# Patient Record
Sex: MALE | Race: WHITE | Employment: OTHER | URBAN - METROPOLITAN AREA
[De-identification: names, ages, dates, MRNs, and addresses within clinical notes are randomized per-mention and may not be internally consistent; named-entity substitution may affect disease eponyms.]

---

## 2021-07-04 ENCOUNTER — HOSPITAL ENCOUNTER (INPATIENT)
Age: 56
LOS: 1 days | Discharge: LEFT BEFORE DISCHARGE | DRG: 394 | End: 2021-07-06
Attending: EMERGENCY MEDICINE | Admitting: FAMILY MEDICINE

## 2021-07-04 ENCOUNTER — APPOINTMENT (OUTPATIENT)
Dept: CT IMAGING | Age: 56
DRG: 394 | End: 2021-07-04
Attending: EMERGENCY MEDICINE

## 2021-07-04 DIAGNOSIS — R11.2 NAUSEA AND VOMITING, INTRACTABILITY OF VOMITING NOT SPECIFIED, UNSPECIFIED VOMITING TYPE: ICD-10-CM

## 2021-07-04 DIAGNOSIS — K92.2 GASTROINTESTINAL HEMORRHAGE, UNSPECIFIED GASTROINTESTINAL HEMORRHAGE TYPE: ICD-10-CM

## 2021-07-04 DIAGNOSIS — R10.84 ABDOMINAL PAIN, GENERALIZED: Primary | ICD-10-CM

## 2021-07-04 LAB
ALBUMIN SERPL-MCNC: 3.3 G/DL (ref 3.5–5)
ALBUMIN/GLOB SERPL: 1.3 {RATIO} (ref 1.1–2.2)
ALP SERPL-CCNC: 71 U/L (ref 45–117)
ALT SERPL-CCNC: 25 U/L (ref 12–78)
ANION GAP SERPL CALC-SCNC: 6 MMOL/L (ref 5–15)
APPEARANCE UR: CLEAR
APTT PPP: 26 SEC (ref 22.1–31)
AST SERPL-CCNC: 18 U/L (ref 15–37)
BASOPHILS # BLD: 0 K/UL (ref 0–0.1)
BASOPHILS NFR BLD: 1 % (ref 0–1)
BILIRUB SERPL-MCNC: 0.5 MG/DL (ref 0.2–1)
BILIRUB UR QL: NEGATIVE
BUN SERPL-MCNC: 12 MG/DL (ref 6–20)
BUN/CREAT SERPL: 13 (ref 12–20)
CALCIUM SERPL-MCNC: 7.9 MG/DL (ref 8.5–10.1)
CHLORIDE SERPL-SCNC: 107 MMOL/L (ref 97–108)
CO2 SERPL-SCNC: 28 MMOL/L (ref 21–32)
COLOR UR: ABNORMAL
CREAT SERPL-MCNC: 0.91 MG/DL (ref 0.7–1.3)
DIFFERENTIAL METHOD BLD: ABNORMAL
EOSINOPHIL # BLD: 0.1 K/UL (ref 0–0.4)
EOSINOPHIL NFR BLD: 2 % (ref 0–7)
ERYTHROCYTE [DISTWIDTH] IN BLOOD BY AUTOMATED COUNT: 19.7 % (ref 11.5–14.5)
GLOBULIN SER CALC-MCNC: 2.6 G/DL (ref 2–4)
GLUCOSE SERPL-MCNC: 122 MG/DL (ref 65–100)
GLUCOSE UR STRIP.AUTO-MCNC: NEGATIVE MG/DL
HCT VFR BLD AUTO: 24.5 % (ref 36.6–50.3)
HGB BLD-MCNC: 7.6 G/DL (ref 12.1–17)
HGB UR QL STRIP: NEGATIVE
HISTORY CHECKED?,CKHIST: NORMAL
IMM GRANULOCYTES # BLD AUTO: 0 K/UL (ref 0–0.04)
IMM GRANULOCYTES NFR BLD AUTO: 0 % (ref 0–0.5)
INR PPP: 1.1 (ref 0.9–1.1)
KETONES UR QL STRIP.AUTO: ABNORMAL MG/DL
LACTATE SERPL-SCNC: 1.1 MMOL/L (ref 0.4–2)
LACTATE SERPL-SCNC: 1.6 MMOL/L (ref 0.4–2)
LEUKOCYTE ESTERASE UR QL STRIP.AUTO: NEGATIVE
LIPASE SERPL-CCNC: 198 U/L (ref 73–393)
LYMPHOCYTES # BLD: 0.3 K/UL (ref 0.8–3.5)
LYMPHOCYTES NFR BLD: 12 % (ref 12–49)
MCH RBC QN AUTO: 25.2 PG (ref 26–34)
MCHC RBC AUTO-ENTMCNC: 31 G/DL (ref 30–36.5)
MCV RBC AUTO: 81.1 FL (ref 80–99)
MONOCYTES # BLD: 0.5 K/UL (ref 0–1)
MONOCYTES NFR BLD: 18 % (ref 5–13)
NEUTS SEG # BLD: 1.8 K/UL (ref 1.8–8)
NEUTS SEG NFR BLD: 67 % (ref 32–75)
NITRITE UR QL STRIP.AUTO: NEGATIVE
NRBC # BLD: 0 K/UL (ref 0–0.01)
NRBC BLD-RTO: 0 PER 100 WBC
PH UR STRIP: 6.5 [PH] (ref 5–8)
PLATELET # BLD AUTO: 147 K/UL (ref 150–400)
PMV BLD AUTO: 9.8 FL (ref 8.9–12.9)
POTASSIUM SERPL-SCNC: 3.1 MMOL/L (ref 3.5–5.1)
PROT SERPL-MCNC: 5.9 G/DL (ref 6.4–8.2)
PROT UR STRIP-MCNC: NEGATIVE MG/DL
PROTHROMBIN TIME: 11.5 SEC (ref 9–11.1)
RBC # BLD AUTO: 3.02 M/UL (ref 4.1–5.7)
RBC MORPH BLD: ABNORMAL
RBC MORPH BLD: ABNORMAL
SODIUM SERPL-SCNC: 141 MMOL/L (ref 136–145)
SP GR UR REFRACTOMETRY: 1.02 (ref 1–1.03)
THERAPEUTIC RANGE,PTTT: NORMAL SECS (ref 58–77)
UROBILINOGEN UR QL STRIP.AUTO: 0.2 EU/DL (ref 0.2–1)
WBC # BLD AUTO: 2.7 K/UL (ref 4.1–11.1)

## 2021-07-04 PROCEDURE — 96376 TX/PRO/DX INJ SAME DRUG ADON: CPT

## 2021-07-04 PROCEDURE — 74011000258 HC RX REV CODE- 258: Performed by: EMERGENCY MEDICINE

## 2021-07-04 PROCEDURE — 96374 THER/PROPH/DIAG INJ IV PUSH: CPT

## 2021-07-04 PROCEDURE — 86900 BLOOD TYPING SEROLOGIC ABO: CPT

## 2021-07-04 PROCEDURE — 85025 COMPLETE CBC W/AUTO DIFF WBC: CPT

## 2021-07-04 PROCEDURE — 96375 TX/PRO/DX INJ NEW DRUG ADDON: CPT

## 2021-07-04 PROCEDURE — 74011250636 HC RX REV CODE- 250/636: Performed by: EMERGENCY MEDICINE

## 2021-07-04 PROCEDURE — 74176 CT ABD & PELVIS W/O CONTRAST: CPT

## 2021-07-04 PROCEDURE — 86923 COMPATIBILITY TEST ELECTRIC: CPT

## 2021-07-04 PROCEDURE — 85027 COMPLETE CBC AUTOMATED: CPT

## 2021-07-04 PROCEDURE — 85610 PROTHROMBIN TIME: CPT

## 2021-07-04 PROCEDURE — 96365 THER/PROPH/DIAG IV INF INIT: CPT

## 2021-07-04 PROCEDURE — 83690 ASSAY OF LIPASE: CPT

## 2021-07-04 PROCEDURE — 81003 URINALYSIS AUTO W/O SCOPE: CPT

## 2021-07-04 PROCEDURE — 80053 COMPREHEN METABOLIC PANEL: CPT

## 2021-07-04 PROCEDURE — 99254 IP/OBS CNSLTJ NEW/EST MOD 60: CPT | Performed by: SURGERY

## 2021-07-04 PROCEDURE — 85730 THROMBOPLASTIN TIME PARTIAL: CPT

## 2021-07-04 PROCEDURE — 87040 BLOOD CULTURE FOR BACTERIA: CPT

## 2021-07-04 PROCEDURE — 36415 COLL VENOUS BLD VENIPUNCTURE: CPT

## 2021-07-04 PROCEDURE — 99285 EMERGENCY DEPT VISIT HI MDM: CPT

## 2021-07-04 PROCEDURE — 83605 ASSAY OF LACTIC ACID: CPT

## 2021-07-04 RX ORDER — HYDROMORPHONE HYDROCHLORIDE 1 MG/ML
1 INJECTION, SOLUTION INTRAMUSCULAR; INTRAVENOUS; SUBCUTANEOUS ONCE
Status: COMPLETED | OUTPATIENT
Start: 2021-07-04 | End: 2021-07-04

## 2021-07-04 RX ORDER — SODIUM CHLORIDE 9 MG/ML
250 INJECTION, SOLUTION INTRAVENOUS AS NEEDED
Status: DISCONTINUED | OUTPATIENT
Start: 2021-07-04 | End: 2021-07-06 | Stop reason: HOSPADM

## 2021-07-04 RX ORDER — ONDANSETRON 2 MG/ML
8 INJECTION INTRAMUSCULAR; INTRAVENOUS
Status: COMPLETED | OUTPATIENT
Start: 2021-07-04 | End: 2021-07-04

## 2021-07-04 RX ORDER — HYDROMORPHONE HYDROCHLORIDE 2 MG/ML
2 INJECTION, SOLUTION INTRAMUSCULAR; INTRAVENOUS; SUBCUTANEOUS
Status: COMPLETED | OUTPATIENT
Start: 2021-07-04 | End: 2021-07-04

## 2021-07-04 RX ORDER — HYDROMORPHONE HYDROCHLORIDE 1 MG/ML
1 INJECTION, SOLUTION INTRAMUSCULAR; INTRAVENOUS; SUBCUTANEOUS
Status: COMPLETED | OUTPATIENT
Start: 2021-07-04 | End: 2021-07-04

## 2021-07-04 RX ADMIN — PIPERACILLIN AND TAZOBACTAM 3.38 G: 3; .375 INJECTION, POWDER, LYOPHILIZED, FOR SOLUTION INTRAVENOUS at 20:38

## 2021-07-04 RX ADMIN — HYDROMORPHONE HYDROCHLORIDE 2 MG: 2 INJECTION INTRAMUSCULAR; INTRAVENOUS; SUBCUTANEOUS at 23:54

## 2021-07-04 RX ADMIN — HYDROMORPHONE HYDROCHLORIDE 1 MG: 1 INJECTION, SOLUTION INTRAMUSCULAR; INTRAVENOUS; SUBCUTANEOUS at 21:32

## 2021-07-04 RX ADMIN — ONDANSETRON 8 MG: 2 INJECTION INTRAMUSCULAR; INTRAVENOUS at 20:38

## 2021-07-04 RX ADMIN — HYDROMORPHONE HYDROCHLORIDE 1 MG: 1 INJECTION, SOLUTION INTRAMUSCULAR; INTRAVENOUS; SUBCUTANEOUS at 20:38

## 2021-07-04 RX ADMIN — SODIUM CHLORIDE 1000 ML: 9 INJECTION, SOLUTION INTRAVENOUS at 20:38

## 2021-07-04 NOTE — ED NOTES
Verbal shift change report given to Hank RN (oncoming nurse) by Renetta Garcia RN (offgoing nurse). Report included the following information SBAR and ED Summary.

## 2021-07-04 NOTE — ED TRIAGE NOTES
Triage: Pt arrives ambulatory with CC abdominal pain and abdominal swelling. He reports he has been had multiple bowel perforations and bowel obstructions. He also has hx of peritonitis and lung carcinoma with bowel mets. He reports the pain he is experiencing seems more like his previous obstructions. He reports some bloody stool.

## 2021-07-05 ENCOUNTER — APPOINTMENT (OUTPATIENT)
Dept: GENERAL RADIOLOGY | Age: 56
DRG: 394 | End: 2021-07-05
Attending: EMERGENCY MEDICINE

## 2021-07-05 VITALS
HEIGHT: 74 IN | WEIGHT: 152 LBS | OXYGEN SATURATION: 100 % | HEART RATE: 85 BPM | TEMPERATURE: 98.5 F | RESPIRATION RATE: 15 BRPM | DIASTOLIC BLOOD PRESSURE: 63 MMHG | SYSTOLIC BLOOD PRESSURE: 121 MMHG | BODY MASS INDEX: 19.51 KG/M2

## 2021-07-05 PROBLEM — K92.2 GIB (GASTROINTESTINAL BLEEDING): Status: ACTIVE | Noted: 2021-07-05

## 2021-07-05 LAB
COMMENT, HOLDF: NORMAL
ERYTHROCYTE [DISTWIDTH] IN BLOOD BY AUTOMATED COUNT: 20 % (ref 11.5–14.5)
GASTROCULT GAST QL: NEGATIVE
HCT VFR BLD AUTO: 25.9 % (ref 36.6–50.3)
HGB BLD-MCNC: 7.8 G/DL (ref 12.1–17)
MCH RBC QN AUTO: 24.8 PG (ref 26–34)
MCHC RBC AUTO-ENTMCNC: 30.1 G/DL (ref 30–36.5)
MCV RBC AUTO: 82.2 FL (ref 80–99)
NRBC # BLD: 0 K/UL (ref 0–0.01)
NRBC BLD-RTO: 0 PER 100 WBC
PH GAST: 3 [PH] (ref 1.5–3.5)
PLATELET # BLD AUTO: 150 K/UL (ref 150–400)
PMV BLD AUTO: 9.8 FL (ref 8.9–12.9)
RBC # BLD AUTO: 3.15 M/UL (ref 4.1–5.7)
SAMPLES BEING HELD,HOLD: NORMAL
WBC # BLD AUTO: 2.9 K/UL (ref 4.1–11.1)

## 2021-07-05 PROCEDURE — 65660000000 HC RM CCU STEPDOWN

## 2021-07-05 PROCEDURE — 82271 OCCULT BLOOD OTHER SOURCES: CPT

## 2021-07-05 PROCEDURE — 74011250636 HC RX REV CODE- 250/636: Performed by: INTERNAL MEDICINE

## 2021-07-05 PROCEDURE — 74011250636 HC RX REV CODE- 250/636: Performed by: FAMILY MEDICINE

## 2021-07-05 PROCEDURE — 74018 RADEX ABDOMEN 1 VIEW: CPT

## 2021-07-05 PROCEDURE — 74011000258 HC RX REV CODE- 258: Performed by: HOSPITALIST

## 2021-07-05 PROCEDURE — 36430 TRANSFUSION BLD/BLD COMPNT: CPT

## 2021-07-05 PROCEDURE — 74011000250 HC RX REV CODE- 250: Performed by: INTERNAL MEDICINE

## 2021-07-05 PROCEDURE — C9113 INJ PANTOPRAZOLE SODIUM, VIA: HCPCS | Performed by: INTERNAL MEDICINE

## 2021-07-05 PROCEDURE — 74011250636 HC RX REV CODE- 250/636: Performed by: HOSPITALIST

## 2021-07-05 PROCEDURE — P9016 RBC LEUKOCYTES REDUCED: HCPCS

## 2021-07-05 PROCEDURE — 74011250636 HC RX REV CODE- 250/636: Performed by: NURSE PRACTITIONER

## 2021-07-05 RX ORDER — SODIUM CHLORIDE 0.9 % (FLUSH) 0.9 %
5-40 SYRINGE (ML) INJECTION EVERY 8 HOURS
Status: DISCONTINUED | OUTPATIENT
Start: 2021-07-05 | End: 2021-07-06 | Stop reason: HOSPADM

## 2021-07-05 RX ORDER — ONDANSETRON 2 MG/ML
4 INJECTION INTRAMUSCULAR; INTRAVENOUS
Status: DISCONTINUED | OUTPATIENT
Start: 2021-07-05 | End: 2021-07-06 | Stop reason: HOSPADM

## 2021-07-05 RX ORDER — HYDROMORPHONE HYDROCHLORIDE 2 MG/ML
2 INJECTION, SOLUTION INTRAMUSCULAR; INTRAVENOUS; SUBCUTANEOUS
Status: DISCONTINUED | OUTPATIENT
Start: 2021-07-05 | End: 2021-07-05

## 2021-07-05 RX ORDER — DEXTROSE MONOHYDRATE AND SODIUM CHLORIDE 5; .9 G/100ML; G/100ML
75 INJECTION, SOLUTION INTRAVENOUS CONTINUOUS
Status: DISCONTINUED | OUTPATIENT
Start: 2021-07-05 | End: 2021-07-06 | Stop reason: HOSPADM

## 2021-07-05 RX ORDER — HYDROCORTISONE 1 %
CREAM (GRAM) TOPICAL
Status: DISCONTINUED | OUTPATIENT
Start: 2021-07-05 | End: 2021-07-06 | Stop reason: HOSPADM

## 2021-07-05 RX ORDER — ACETAMINOPHEN 325 MG/1
650 TABLET ORAL
Status: DISCONTINUED | OUTPATIENT
Start: 2021-07-05 | End: 2021-07-06 | Stop reason: HOSPADM

## 2021-07-05 RX ORDER — HYDROMORPHONE HCL/0.9% NACL/PF 0.5 MG/ML
PLASTIC BAG, INJECTION (ML) INTRAVENOUS CONTINUOUS
Status: DISCONTINUED | OUTPATIENT
Start: 2021-07-05 | End: 2021-07-06 | Stop reason: HOSPADM

## 2021-07-05 RX ORDER — SODIUM CHLORIDE 0.9 % (FLUSH) 0.9 %
5-40 SYRINGE (ML) INJECTION AS NEEDED
Status: DISCONTINUED | OUTPATIENT
Start: 2021-07-05 | End: 2021-07-06 | Stop reason: HOSPADM

## 2021-07-05 RX ORDER — HYDROMORPHONE HYDROCHLORIDE 1 MG/ML
1 INJECTION, SOLUTION INTRAMUSCULAR; INTRAVENOUS; SUBCUTANEOUS
Status: DISCONTINUED | OUTPATIENT
Start: 2021-07-05 | End: 2021-07-05

## 2021-07-05 RX ORDER — ACETAMINOPHEN 650 MG/1
650 SUPPOSITORY RECTAL
Status: DISCONTINUED | OUTPATIENT
Start: 2021-07-05 | End: 2021-07-06 | Stop reason: HOSPADM

## 2021-07-05 RX ORDER — HYDROMORPHONE HYDROCHLORIDE 2 MG/ML
2 INJECTION, SOLUTION INTRAMUSCULAR; INTRAVENOUS; SUBCUTANEOUS
Status: DISCONTINUED | OUTPATIENT
Start: 2021-07-05 | End: 2021-07-06 | Stop reason: HOSPADM

## 2021-07-05 RX ADMIN — HYDROMORPHONE HYDROCHLORIDE 2 MG: 2 INJECTION INTRAMUSCULAR; INTRAVENOUS; SUBCUTANEOUS at 14:03

## 2021-07-05 RX ADMIN — Medication 10 ML: at 17:40

## 2021-07-05 RX ADMIN — PIPERACILLIN AND TAZOBACTAM 3.38 G: 3; .375 INJECTION, POWDER, LYOPHILIZED, FOR SOLUTION INTRAVENOUS at 09:44

## 2021-07-05 RX ADMIN — DEXTROSE AND SODIUM CHLORIDE 75 ML/HR: 5; 900 INJECTION, SOLUTION INTRAVENOUS at 14:03

## 2021-07-05 RX ADMIN — SODIUM CHLORIDE 40 MG: 9 INJECTION INTRAMUSCULAR; INTRAVENOUS; SUBCUTANEOUS at 20:47

## 2021-07-05 RX ADMIN — ONDANSETRON 4 MG: 2 INJECTION INTRAMUSCULAR; INTRAVENOUS at 19:24

## 2021-07-05 RX ADMIN — HYDROMORPHONE HYDROCHLORIDE 2 MG: 2 INJECTION INTRAMUSCULAR; INTRAVENOUS; SUBCUTANEOUS at 20:47

## 2021-07-05 RX ADMIN — HYDROMORPHONE HYDROCHLORIDE 1 MG: 1 INJECTION, SOLUTION INTRAMUSCULAR; INTRAVENOUS; SUBCUTANEOUS at 06:26

## 2021-07-05 RX ADMIN — HYDROMORPHONE HYDROCHLORIDE 2 MG: 2 INJECTION INTRAMUSCULAR; INTRAVENOUS; SUBCUTANEOUS at 23:09

## 2021-07-05 RX ADMIN — HYDROMORPHONE HYDROCHLORIDE 2 MG: 2 INJECTION INTRAMUSCULAR; INTRAVENOUS; SUBCUTANEOUS at 17:40

## 2021-07-05 RX ADMIN — HYDROMORPHONE HYDROCHLORIDE 1 MG: 1 INJECTION, SOLUTION INTRAMUSCULAR; INTRAVENOUS; SUBCUTANEOUS at 03:18

## 2021-07-05 RX ADMIN — HYDROMORPHONE HYDROCHLORIDE 2 MG: 2 INJECTION INTRAMUSCULAR; INTRAVENOUS; SUBCUTANEOUS at 09:27

## 2021-07-05 NOTE — ED NOTES
26 Pt is refusing to stay in room and states that he wants to leave.  Pt decides to stay upon this RN talking to pt.    2300 Pt continues to pace in hallway and  door frame

## 2021-07-05 NOTE — H&P
[address] [facility] Adult  Hospitalist Group  History and Physical    Primary Care Provider: Unknown, Provider, MD  Date of Service:  7/5/2021    Subjective:     Tosin Roldan is a 55 y.o. 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 In the ED, his vital signs were stable. Significant for WBC of 2.7, and hemoglobin 7.6. CT abdomen, pelvis showed pneumatosis of the ascending colon with minimal adjacent mesenteric stranding possibly suggestive of early ischemic bowel. There is also diffuse colonic distention without definite obstruction. In the ED, he was treated with Zosyn, Dilaudid, and 1 L normal saline. General surgery was consulted, and recommended conservative management with placement of NG tube to evaluate for GI hemorrhage, and serial imaging with KUB. Review of Systems:    All other review of systems were negative except for that written in the History of Present Illness. Pmhx: carcinoid, multiple abdominal surgeries for perforated bowel. No past surgical history on file. Prior to Admission medications    Not on File     Allergies   Allergen Reactions    Contrast Dye [Iodine] Anaphylaxis    Fentanyl Anaphylaxis    Morphine Hives      No family history on file. SOCIAL HISTORY:  Patient resides at Home. Patient ambulates independently. Smoking history: none  Alcohol history[de-identified] none    Objective:       Physical Exam:   Visit Vitals  /78   Pulse 80   Temp 98.1 °F (36.7 °C)   Resp 16   SpO2 98%     General:  Alert, cooperative, no distress, appears stated age. Head:  Normocephalic, without obvious abnormality, atraumatic. Eyes:  Conjunctivae/corneas clear. EOMs intact. Ears:  Normal TMs and external ear canals both ears. Nose: Nares normal. Septum midline.     Throat: Lips, mucosa, and tongue normal.    Neck: Supple, symmetrical, trachea midline enlargement/tenderness/nodules   Back:   Symmetric, no curvature. ROM normal.    Lungs:   Clear to auscultation bilaterally. Chest wall:  No tenderness or deformity. Heart:  Regular rate and rhythm, S1, S2 normal, no murmur, click, rub or gallop. Abdomen:   Soft, non-tender. Bowel sounds normal. No masses,  No organomegaly. Distended, tender. Extremities: Extremities normal, atraumatic, no cyanosis or edema. Pulses: 2+ radial pulses   Skin: Skin color, texture, turgor normal. No rashes or lesions       Data Review: All diagnostic labs and studies have been reviewed. Assessment:     Active Problems:    * No active hospital problems. *      Plan:     #Ileus  #Intractable abdominal pain  #Hx multiple abdominal surgeries, obstruction, and perforation  -Patient has a history of multiple abdominal surgeries for perforation, and obstruction, and ischemic bowel. He was recently hospitalized in [place] for 4 months for above., and was in [place] for his mother's , when he developed hematemesis, bloody stool, and intractable abdominal pain. Medical records have been requested from [facility] and they are. CT abdomen pelvis with pneumatosis in the ascending colon, and changes suggestive of early ischemic bowel. Lactate is normal but he is neutropenic.   General surgery consulted, and recommends NG tube and conservative management  We will Hemoccult stool, and gastric contents  Dilaudid for pain    #Carcinoid  Patient gets his treatment in [place] records have been requested    #neutropenia  -b/l unknown  -ANC 1.8    FEN: N.p.o.  DVT PPx: SCD  MPOA:  Code: Full code    FUNCTIONAL STATUS PRIOR TO HOSPITALIZATION Ambulates Independently     Signed By: Darrell Epstein MD     2021

## 2021-07-05 NOTE — CONSULTS
Surgery Consult    Subjective:      Wm Crowder is a 55 y.o. male who presented to the hospital complaining of dark coffee-ground emesis and bloody stools. Additionally he has noted that his abdomen has become swollen and tense. He complains of abdominal pain. The patient states that he has a history of approximately 15 abdominal surgeries including multiple for perforation ischemic bowel other operations for carcinoid tumor. He was recently discharged from the hospital in [place] after being there for 4 months after a perforated bowel. He states that this feels similar to his other episodes of ischemia. He states that he had blood work done a few days ago with a hemoglobin of 11. Unfortunately we do not have any of his records as they are all in [place]. He denies any fever chills sweats. There are no problems to display for this patient. No past medical history on file. No past surgical history on file. Social History     Tobacco Use    Smoking status: Not on file   Substance Use Topics    Alcohol use: Not on file      No family history on file. Current Facility-Administered Medications   Medication Dose Route Frequency    0.9% sodium chloride infusion 250 mL  250 mL IntraVENous PRN     No current outpatient medications on file.       Allergies   Allergen Reactions    Contrast Dye [Iodine] Anaphylaxis    Fentanyl Anaphylaxis    Morphine Hives       Review of Systems:    Constitutional: positive for sweats  Eyes: negative  Ears, Nose, Mouth, Throat, and Face: negative  Respiratory: negative  Cardiovascular: negative  Gastrointestinal: positive for melena and abdominal pain  Genitourinary:negative  Integument/Breast: negative  Hematologic/Lymphatic: negative  Musculoskeletal:negative  Neurological: negative  Behavioral/Psychiatric: negative    Objective:        Visit Vitals  /65   Pulse 80   Temp 98.1 °F (36.7 °C)   Resp 16   SpO2 99%       Physical Exam:  GENERAL: alert, cooperative, mild distress, appears stated age, EYE: negative, THROAT & NECK: normal, LUNG: clear to auscultation bilaterally, HEART: regular rate and rhythm, ABDOMEN: Distended mild diffuse tenderness, large midline incision EXTREMITIES:  no edema, SKIN: Normal., NEUROLOGIC: negative, PSYCH: non focal    Imaging:  images and reports reviewed  CT- Pneumatosis of the ascending colon with minimal adjacent mesenteric  stranding, consider early ischemic bowel. No free air, portal venous gas or  mesenteric venous gas. Diffuse colonic distention without definite obstruction. Moderate amount of stool. 2. Postsurgical changes involving the stomach and small bowel. Stomach is  distended with large amount of food material. Fluid-filled, borderline dilated  small bowel loops. Possible ileus given reported history of recent abdominal  surgeries. 3. Cholelithiasis. Lab/Data Review: All lab results for the last 24 hours reviewed. Recent Results (from the past 24 hour(s))   CBC WITH AUTOMATED DIFF    Collection Time: 07/04/21  8:28 PM   Result Value Ref Range    WBC 2.7 (L) 4.1 - 11.1 K/uL    RBC 3.02 (L) 4.10 - 5.70 M/uL    HGB 7.6 (L) 12.1 - 17.0 g/dL    HCT 24.5 (L) 36.6 - 50.3 %    MCV 81.1 80.0 - 99.0 FL    MCH 25.2 (L) 26.0 - 34.0 PG    MCHC 31.0 30.0 - 36.5 g/dL    RDW 19.7 (H) 11.5 - 14.5 %    PLATELET 822 (L) 246 - 400 K/uL    MPV 9.8 8.9 - 12.9 FL    NRBC 0.0 0  WBC    ABSOLUTE NRBC 0.00 0.00 - 0.01 K/uL    NEUTROPHILS 67 32 - 75 %    LYMPHOCYTES 12 12 - 49 %    MONOCYTES 18 (H) 5 - 13 %    EOSINOPHILS 2 0 - 7 %    BASOPHILS 1 0 - 1 %    IMMATURE GRANULOCYTES 0 0.0 - 0.5 %    ABS. NEUTROPHILS 1.8 1.8 - 8.0 K/UL    ABS. LYMPHOCYTES 0.3 (L) 0.8 - 3.5 K/UL    ABS. MONOCYTES 0.5 0.0 - 1.0 K/UL    ABS. EOSINOPHILS 0.1 0.0 - 0.4 K/UL    ABS. BASOPHILS 0.0 0.0 - 0.1 K/UL    ABS. IMM.  GRANS. 0.0 0.00 - 0.04 K/UL    DF SMEAR SCANNED      RBC COMMENTS ANISOCYTOSIS  1+        RBC COMMENTS HYPOCHROMIA  1+ METABOLIC PANEL, COMPREHENSIVE    Collection Time: 07/04/21  8:28 PM   Result Value Ref Range    Sodium 141 136 - 145 mmol/L    Potassium 3.1 (L) 3.5 - 5.1 mmol/L    Chloride 107 97 - 108 mmol/L    CO2 28 21 - 32 mmol/L    Anion gap 6 5 - 15 mmol/L    Glucose 122 (H) 65 - 100 mg/dL    BUN 12 6 - 20 MG/DL    Creatinine 0.91 0.70 - 1.30 MG/DL    BUN/Creatinine ratio 13 12 - 20      GFR est AA >60 >60 ml/min/1.73m2    GFR est non-AA >60 >60 ml/min/1.73m2    Calcium 7.9 (L) 8.5 - 10.1 MG/DL    Bilirubin, total 0.5 0.2 - 1.0 MG/DL    ALT (SGPT) 25 12 - 78 U/L    AST (SGOT) 18 15 - 37 U/L    Alk.  phosphatase 71 45 - 117 U/L    Protein, total 5.9 (L) 6.4 - 8.2 g/dL    Albumin 3.3 (L) 3.5 - 5.0 g/dL    Globulin 2.6 2.0 - 4.0 g/dL    A-G Ratio 1.3 1.1 - 2.2     LIPASE    Collection Time: 07/04/21  8:28 PM   Result Value Ref Range    Lipase 198 73 - 393 U/L   PTT    Collection Time: 07/04/21  8:28 PM   Result Value Ref Range    aPTT 26.0 22.1 - 31.0 sec    aPTT, therapeutic range     58.0 - 77.0 SECS   PROTHROMBIN TIME + INR    Collection Time: 07/04/21  8:28 PM   Result Value Ref Range    INR 1.1 0.9 - 1.1      Prothrombin time 11.5 (H) 9.0 - 11.1 sec   LACTIC ACID    Collection Time: 07/04/21  8:28 PM   Result Value Ref Range    Lactic acid 1.6 0.4 - 2.0 MMOL/L   TYPE & SCREEN    Collection Time: 07/04/21  8:46 PM   Result Value Ref Range    Crossmatch Expiration 07/07/2021,2359     ABO/Rh(D) Kristin Frees POSITIVE     Antibody screen NEG    URINALYSIS W/ RFLX MICROSCOPIC    Collection Time: 07/04/21  9:35 PM   Result Value Ref Range    Color YELLOW/STRAW      Appearance CLEAR CLEAR      Specific gravity 1.022 1.003 - 1.030      pH (UA) 6.5 5.0 - 8.0      Protein Negative NEG mg/dL    Glucose Negative NEG mg/dL    Ketone TRACE (A) NEG mg/dL    Bilirubin Negative NEG      Blood Negative NEG      Urobilinogen 0.2 0.2 - 1.0 EU/dL    Nitrites Negative NEG      Leukocyte Esterase Negative NEG     RBC, ALLOCATE    Collection Time: 07/04/21 9:45 PM   Result Value Ref Range    HISTORY CHECKED? Historical check performed        Assessment:Plan   Abdominal pain GI bleed. Based on the current information the patient may have  A GI bleed with some component of ischemia. However his lactic acid is normal   While there may be the suggestion of pneumatosis on his CT scan this does not necessarily require operative intervention. Would recommend conservative treatment at this point. Would likely benefit from an NG tube which would give us an idea of how active his GI bleed is and to help with some of his distention. May require transfusion. The patient states that he has had a hemoglobin of 11.5 about 3 days ago and it is now 7.4 which would be consistent with GI bleed. Of course should he develop pneumoperitoneum or clear signs of irreversible ischemia he may require another operative intervention. Apparently he has already had 15 operations and has had most of his small intestines and a large portion of his colon removed which obviously complicates things. We will continue to follow along with you.

## 2021-07-05 NOTE — ROUTINE PROCESS
TRANSFER - OUT REPORT:    Verbal report given to Andreia Caballero RN(name) on Maria Antonia Melendez  being transferred to [facility]) for routine progression of care       Report consisted of patients Situation, Background, Assessment and   Recommendations(SBAR). Information from the following report(s) SBAR, ED Summary, Intake/Output and Recent Results was reviewed with the receiving nurse. Lines:   Peripheral IV 07/05/21 Right Hand (Active)        Opportunity for questions and clarification was provided.       Patient transported with:   Registered Nurse

## 2021-07-05 NOTE — ED NOTES
Pt's US peripheral IV infiltrated and RN unable to initiate blood transfusion at this time. Pt is yelling to start an IV in his leg and that he won't be stuck in his arms anymore. Dr Doc Brown paged.

## 2021-07-05 NOTE — ED NOTES
Pt is refusing EJ and states he wants Romina Mcarthur to come back in and do another US IV.  Pt pulled out NG tube

## 2021-07-05 NOTE — ED NOTES
[facility] Consult for Admission  12:27 AM    ED Room Number: ER09/09  Patient Name and age:  Vidhi Wadsworth 55 y.o.  male  Working Diagnosis:   1. Abdominal pain, generalized    2. Nausea and vomiting, intractability of vomiting not specified, unspecified vomiting type    3. Gastrointestinal hemorrhage, unspecified gastrointestinal hemorrhage type        COVID-19 Suspicion:  no  Sepsis present:  no  Reassessment needed: no  Code Status:  Full Code  Readmission: no  Isolation Requirements:  no  Recommended Level of Care:  telemetry  Department:[facility] Adult ED - 21   Other: Dr. Berna Garcia saw this patient. He consulted the ICU who feels he is okay for the floor. He has been difficult. Consult note: Dr. Aixa Tovar -will arrange admission.   Markel Cade MD

## 2021-07-05 NOTE — ED NOTES
Pt is ringing call bell and stating that he wants to leave AMA. Upon entry to room pt becomes verbally aggressive with this RN. RN has informed pt that Dr Vanessa Carroll has been paged to discuss next steps in attempting IV access. Pt states that he cannot have a central line due to scar tissue and wants to talk to the supervisor. Pt has told this RN to not come back and he wants someone else to care for him. Charge nurse aware. Dr Vanessa Carroll returned call and said to attempt EJ access and that pt may be candidate for PICC line tomorrow. Pt is hemodynamically stable at this time and does not emergently need blood transfusion. Will give pt patient advocacy number and perform gastric occult.

## 2021-07-05 NOTE — PROGRESS NOTES
TRANSFER - IN REPORT:    Verbal report received from [facility] (name) on Guy Travis  being received from ED(unit) for routine progression of care      Report consisted of patients Situation, Background, Assessment and   Recommendations(SBAR). Information from the following report(s) SBAR, Kardex, ED Summary, Procedure Summary, Intake/Output, MAR, Accordion, Recent Results, Med Rec Status, Cardiac Rhythm Unknown, Alarm Parameters , Pre Procedure Checklist, Procedure Verification, Quality Measures and Dual Neuro Assessment was reviewed with the receiving nurse. Opportunity for questions and clarification was provided. Assessment completed upon patients arrival to unit and care assumed. Pt refused to wear the cardiac monitor states that he is allergic. Pt refused skin check. Pt states that he has PTSD and needs to walk around      0600: Pt walked out of the room asked to speak with the nurse. I spoke with pt who states that he needs his pain meds now. Informed pt that I am currently working with someone and it will be a few minutes before I can get to him because I am working with someone else treating their pain. But was very upset states that he is leaving wanted to talk to the nursing supervisor because I could not get pain meds NOW   Pt went to nurses station and begin to get belligerent  , curse , and yell at the charge nurse. Code [facility] called.

## 2021-07-05 NOTE — PROGRESS NOTES
Progress Note    Patient: Lindsey Fischer MRN: [id number]  SSN: [id number]    YOB: 1965  Age: 55 y.o. Sex: male      Admit Date: 2021      Subjective:     Patient complains of ongoing abdominal pain, distention. Denies nausea or vomiting he is agitated, complaining about lack of attention from nursing staff. Objective:     Visit Vitals  /64   Pulse 97   Temp 98.4 °F (36.9 °C)   Resp 18   SpO2 97%       Temp (24hrs), Av.3 °F (36.8 °C), Min:98.1 °F (36.7 °C), Max:98.4 °F (36.9 °C)      Physical Exam:    ABDOMEN: Distended, well-healed scars. Patient would not allow me to examine him. Data Review: Vital signs, ins/outs, labs    Lab Review:   Recent Results (from the past 24 hour(s))   CULTURE, BLOOD, PAIRED    Collection Time: 21  8:08 PM    Specimen: Blood   Result Value Ref Range    Special Requests: NO SPECIAL REQUESTS      Culture result: NO GROWTH AFTER 7 HOURS     CBC WITH AUTOMATED DIFF    Collection Time: 21  8:28 PM   Result Value Ref Range    WBC 2.7 (L) 4.1 - 11.1 K/uL    RBC 3.02 (L) 4.10 - 5.70 M/uL    HGB 7.6 (L) 12.1 - 17.0 g/dL    HCT 24.5 (L) 36.6 - 50.3 %    MCV 81.1 80.0 - 99.0 FL    MCH 25.2 (L) 26.0 - 34.0 PG    MCHC 31.0 30.0 - 36.5 g/dL    RDW 19.7 (H) 11.5 - 14.5 %    PLATELET 362 (L) 217 - 400 K/uL    MPV 9.8 8.9 - 12.9 FL    NRBC 0.0 0  WBC    ABSOLUTE NRBC 0.00 0.00 - 0.01 K/uL    NEUTROPHILS 67 32 - 75 %    LYMPHOCYTES 12 12 - 49 %    MONOCYTES 18 (H) 5 - 13 %    EOSINOPHILS 2 0 - 7 %    BASOPHILS 1 0 - 1 %    IMMATURE GRANULOCYTES 0 0.0 - 0.5 %    ABS. NEUTROPHILS 1.8 1.8 - 8.0 K/UL    ABS. LYMPHOCYTES 0.3 (L) 0.8 - 3.5 K/UL    ABS. MONOCYTES 0.5 0.0 - 1.0 K/UL    ABS. EOSINOPHILS 0.1 0.0 - 0.4 K/UL    ABS. BASOPHILS 0.0 0.0 - 0.1 K/UL    ABS. IMM.  GRANS. 0.0 0.00 - 0.04 K/UL    DF SMEAR SCANNED      RBC COMMENTS ANISOCYTOSIS  1+        RBC COMMENTS HYPOCHROMIA  1+       METABOLIC PANEL, COMPREHENSIVE    Collection Time: 21  8:28 PM   Result Value Ref Range    Sodium 141 136 - 145 mmol/L    Potassium 3.1 (L) 3.5 - 5.1 mmol/L    Chloride 107 97 - 108 mmol/L    CO2 28 21 - 32 mmol/L    Anion gap 6 5 - 15 mmol/L    Glucose 122 (H) 65 - 100 mg/dL    BUN 12 6 - 20 MG/DL    Creatinine 0.91 0.70 - 1.30 MG/DL    BUN/Creatinine ratio 13 12 - 20      GFR est AA >60 >60 ml/min/1.73m2    GFR est non-AA >60 >60 ml/min/1.73m2    Calcium 7.9 (L) 8.5 - 10.1 MG/DL    Bilirubin, total 0.5 0.2 - 1.0 MG/DL    ALT (SGPT) 25 12 - 78 U/L    AST (SGOT) 18 15 - 37 U/L    Alk.  phosphatase 71 45 - 117 U/L    Protein, total 5.9 (L) 6.4 - 8.2 g/dL    Albumin 3.3 (L) 3.5 - 5.0 g/dL    Globulin 2.6 2.0 - 4.0 g/dL    A-G Ratio 1.3 1.1 - 2.2     LIPASE    Collection Time: 07/04/21  8:28 PM   Result Value Ref Range    Lipase 198 73 - 393 U/L   PTT    Collection Time: 07/04/21  8:28 PM   Result Value Ref Range    aPTT 26.0 22.1 - 31.0 sec    aPTT, therapeutic range     58.0 - 77.0 SECS   PROTHROMBIN TIME + INR    Collection Time: 07/04/21  8:28 PM   Result Value Ref Range    INR 1.1 0.9 - 1.1      Prothrombin time 11.5 (H) 9.0 - 11.1 sec   LACTIC ACID    Collection Time: 07/04/21  8:28 PM   Result Value Ref Range    Lactic acid 1.6 0.4 - 2.0 MMOL/L   TYPE & SCREEN    Collection Time: 07/04/21  8:46 PM   Result Value Ref Range    Crossmatch Expiration 07/07/2021,9757     ABO/Rh(D) O POSITIVE     Antibody screen NEG     Unit number [id number]     Blood component type RC LR,2     Unit division 00     Status of unit ISSUED     Crossmatch result Compatible    URINALYSIS W/ RFLX MICROSCOPIC    Collection Time: 07/04/21  9:35 PM   Result Value Ref Range    Color YELLOW/STRAW      Appearance CLEAR CLEAR      Specific gravity 1.022 1.003 - 1.030      pH (UA) 6.5 5.0 - 8.0      Protein Negative NEG mg/dL    Glucose Negative NEG mg/dL    Ketone TRACE (A) NEG mg/dL    Bilirubin Negative NEG      Blood Negative NEG      Urobilinogen 0.2 0.2 - 1.0 EU/dL    Nitrites Negative NEG Leukocyte Esterase Negative NEG     RBC, ALLOCATE    Collection Time: 07/04/21  9:45 PM   Result Value Ref Range    HISTORY CHECKED? Historical check performed    LACTIC ACID    Collection Time: 07/04/21 10:25 PM   Result Value Ref Range    Lactic acid 1.1 0.4 - 2.0 MMOL/L   CBC W/O DIFF    Collection Time: 07/04/21 11:52 PM   Result Value Ref Range    WBC 2.9 (L) 4.1 - 11.1 K/uL    RBC 3.15 (L) 4.10 - 5.70 M/uL    HGB 7.8 (L) 12.1 - 17.0 g/dL    HCT 25.9 (L) 36.6 - 50.3 %    MCV 82.2 80.0 - 99.0 FL    MCH 24.8 (L) 26.0 - 34.0 PG    MCHC 30.1 30.0 - 36.5 g/dL    RDW 20.0 (H) 11.5 - 14.5 %    PLATELET 657 044 - 075 K/uL    MPV 9.8 8.9 - 12.9 FL    NRBC 0.0 0  WBC    ABSOLUTE NRBC 0.00 0.00 - 0.01 K/uL   SAMPLES BEING HELD    Collection Time: 07/04/21 11:52 PM   Result Value Ref Range    SAMPLES BEING HELD 1PST     COMMENT        Add-on orders for these samples will be processed based on acceptable specimen integrity and analyte stability, which may vary by analyte. OCCULT BLOOD, GASTRIC    Collection Time: 07/05/21  2:28 AM   Result Value Ref Range    OCCULT BLOOD,GASTRIC Negative NEG      pH,GASTRIC 3 1.5 - 3.5           Assessment:     Hospital Problems  Never Reviewed        Codes Class Noted POA    GIB (gastrointestinal bleeding) ICD-10-CM: K92.2  ICD-9-CM: 578.9  7/5/2021 Unknown            Abdominal pain, pneumatosis of right colon on CT scan. Lactate is normal.  No bandemia on CBC. Multiple prior surgeries for neuroendocrine tumor in [place]. No current indication for operation; recommended NG tube insertion to patient. He will consider this option. Plan/Recommendations/Medical Decision Making:     NG tube to low intermittent suction ordered. Continue IV antibiotics. Bowel rest.  Serial labs, exams. Plan interval CT scan in 2 days.

## 2021-07-05 NOTE — CONSULTS
Himanshu Verdin [address] M.D.  [phone]          GASTROENTEROLOGY CONSULTATION NOTE      NAME:  Shoaib Estes   :   1965   MRN:   [id number]       Referring Physician: Iftikhar Mcdonald Date: 2021     Chief Complaint: GIB    History of Present Illness:  Patient is a 55 y.o. with a history of carcinoid tumors and ischemic colitis visiting the area from [place]. Care at [facility]. Presenting with abdominal distention, pain, and bleeding. Pt states that he had some episodes of dark bloody vomiting and blood in his bowel movement. He states this is consistent with his ischemic colitis history. He had a NGT placed without any outpt for 6 hours, so removed. He has started to pass gas. PMH:  No past medical history on file. PSH:  No past surgical history on file. Allergies: Allergies   Allergen Reactions    Contrast Dye [Iodine] Anaphylaxis    Fentanyl Anaphylaxis    Morphine Hives       Home Medications:  None       Hospital Medications:  Current Facility-Administered Medications   Medication Dose Route Frequency    sodium chloride (NS) flush 5-40 mL  5-40 mL IntraVENous Q8H    sodium chloride (NS) flush 5-40 mL  5-40 mL IntraVENous PRN    acetaminophen (TYLENOL) tablet 650 mg  650 mg Oral Q6H PRN    Or    acetaminophen (TYLENOL) suppository 650 mg  650 mg Rectal Q6H PRN    piperacillin-tazobactam (ZOSYN) 3.375 g in 0.9% sodium chloride (MBP/ADV) 100 mL MBP  3.375 g IntraVENous Q8H    dextrose 5% and 0.9% NaCl infusion  75 mL/hr IntraVENous CONTINUOUS    HYDROmorphone (PF) (DILAUDID) injection 2 mg  2 mg IntraVENous Q4H PRN    0.9% sodium chloride infusion 250 mL  250 mL IntraVENous PRN       Family History:  No family history on file.     Social History:  Social History     Tobacco Use    Smoking status: Not on file   Substance Use Topics    Alcohol use: Not on file       Review of Systems:  -Fever  +Chills  -HA  -Dizziness  +Good mood  -Rash  -Jaundice  -Joint pain  -Weight loss  -Odynophagia  -Dysphagia  -Heartburn  -Chest pain  -Dyspnea  +N/V  +Hematemesis  +Abd pain  -Diarrhea  +Constipation  -Dysuria  -Melena  +Hematochezia  The rest of the review of systems is negative except per HPI      Objective:     Patient Vitals for the past 8 hrs:   BP Temp Pulse Resp SpO2   07/05/21 0811  98.4 °F (36.9 °C)  18    07/05/21 0600 100/64    97 %   07/05/21 0500 111/60    98 %   07/05/21 0400 103/63 98.2 °F (36.8 °C) 97  100 %   07/05/21 0330     100 %     No intake/output data recorded. 07/03 1901 - 07/05 0700  In: 1100 [I.V.:1100]  Out: -       PHYSICAL EXAM:  General: Alert, in no acute distress    HEENT: Anicteric conjunctiva. Lungs:            CTA Bilaterally  Heart:  Normal S1, S2    Abdomen: Soft, distended, diffuse tender. Hypoactive bowel sounds, no rebound/invol guarding  MSK:   Normal muscle tone  Skin:   Warm to touch  Extremities: Negative bilateral pedal edema   Psych:   Good insight. Not anxious nor agitated. Lab Data Reviewed:   Recent Labs     07/04/21  2352 07/04/21 2028   WBC 2.9* 2.7*   HGB 7.8* 7.6*   HCT 25.9* 24.5*    147*     Recent Labs     07/04/21 2028      K 3.1*      CO2 28   BUN 12   CREA 0.91   *   CA 7.9*     Recent Labs     07/04/21 2028   AP 71   TP 5.9*   ALB 3.3*   GLOB 2.6   LPSE 198     Recent Labs     07/04/21 2028   INR 1.1   PTP 11.5*   APTT 26.0      No results for input(s): FE, TIBC, PSAT, FERR in the last 72 hours. No results for input(s): CPK, CKMB in the last 72 hours. No lab exists for component: JULIA    See Electronic Medical Record for all procedure/radiology reports and details which were not copied into this note but were reviewed prior to the creation of the Plan. CT: IMPRESSION     1. Pneumatosis of the ascending colon with minimal adjacent mesenteric  stranding, consider early ischemic bowel.  No free air, portal venous gas or  mesenteric venous gas. Diffuse colonic distention without definite obstruction. Moderate amount of stool. 2. Postsurgical changes involving the stomach and small bowel. Stomach is  distended with large amount of food material. Fluid-filled, borderline dilated  small bowel loops. Possible ileus given reported history of recent abdominal  surgeries. 3. Cholelithiasis. Assessment:   · Hematemesis: differential includes ulcers, erosions, esophagitis, MWT, and AVMs. Hemodynamically stable  · Bloody bowel movements likely due to ischemic colitis. 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 Please call with any questions  · Thank you for the consultation. Please call with any questions. Signed by:  Yaneth Torres MD         7/5/2021  10:45 AM

## 2021-07-05 NOTE — ED PROVIDER NOTES
This is a 50-year-old male with a history of anemia, hypertension and COPD. He also has a long history of abdominal issues and surgeries. He has had perforations of his bowels 3 times. He has had ischemic bowel twice and has had numerous GI bleeds. He states that earlier this evening he started with some red blood through his bowel. He also vomited some dark coffee-ground looking material.  He started with swelling in his abdomen and over the last several hours he is really become quite swollen and tense. He is complaining of severe pain. He has not been running any fever. He last ate a day and a half ago. He says he is lost 80% of his small bowel and 25% of his large bowel due to these issues. 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 His last admission was for perforation followed by peritonitis and he was at [place] [address] in [facility] for approximately 4 months and was discharged 2 weeks ago. He came here to [place] because his mother . He has no physicians here. No past medical history on file. No past surgical history on file. No family history on file.     Social History     Socioeconomic History    Marital status:      Spouse name: Not on file    Number of children: Not on file    Years of education: Not on file    Highest education level: Not on file   Occupational History    Not on file   Tobacco Use    Smoking status: Not on file   Substance and Sexual Activity    Alcohol use: Not on file    Drug use: Not on file    Sexual activity: Not on file   Other Topics Concern    Not on file   Social History Narrative    Not on file     Social Determinants of Health     Financial Resource Strain:     Difficulty of Paying Living Expenses:    Food Insecurity:     Worried About Running Out of Food in the Last Year:     [address] in the Last Year:    Transportation Needs:     Lack of Transportation (Medical):  Lack of Transportation (Non-Medical):    Physical Activity:     Days of Exercise per Week:     Minutes of Exercise per Session:    Stress:     Feeling of Stress :    Social Connections:     Frequency of Communication with Friends and Family:     Frequency of Social Gatherings with Friends and Family:     Attends Bahai Services:     Active Member of Clubs or Organizations:     Attends Club or Organization Meetings:     Marital Status:    Intimate Partner Violence:     Fear of Current or Ex-Partner:     Emotionally Abused:     Physically Abused:     Sexually Abused: ALLERGIES: Contrast dye [iodine], Fentanyl, and Morphine    Review of Systems   Constitutional: Positive for diaphoresis. Negative for activity change, appetite change, chills, fatigue and fever. HENT: Negative for ear pain, facial swelling, sore throat and trouble swallowing. Eyes: Negative for pain, discharge and visual disturbance. Respiratory: Negative for chest tightness, shortness of breath and wheezing. Cardiovascular: Negative for chest pain and palpitations. Gastrointestinal: Positive for abdominal pain ( Severe), nausea and vomiting. Negative for blood in stool. Dark coffee ground material in the vomitus and dark stool   Genitourinary: Negative for difficulty urinating, flank pain and hematuria. Musculoskeletal: Negative for arthralgias, joint swelling, myalgias and neck pain. Skin: Negative for color change and rash. Neurological: Negative for dizziness, weakness, numbness and headaches. Hematological: Negative for adenopathy. Does not bruise/bleed easily. Psychiatric/Behavioral: Negative for behavioral problems, confusion and sleep disturbance. All other systems reviewed and are negative.       Vitals:    07/04/21 1913 07/04/21 1930 07/04/21 2048   BP: 132/82 110/68 119/65   Pulse: 80     Resp: 16     Temp: 98.1 °F (36.7 °C)     SpO2: 98% 100% 99%            Physical Exam  Vitals and nursing note reviewed. Constitutional:       General: He is in acute distress. Appearance: He is well-developed. He is ill-appearing. He is not diaphoretic. Comments: This is a 81-year-old male with a history of multiple bowel issues over the years and surgeries. He is in excruciating pain and vital signs are as noted. HENT:      Head: Normocephalic and atraumatic. Nose: Nose normal.   Eyes:      General: No scleral icterus. Conjunctiva/sclera: Conjunctivae normal.      Pupils: Pupils are equal, round, and reactive to light. Neck:      Thyroid: No thyromegaly. Vascular: No JVD. Trachea: No tracheal deviation. Comments: No carotid bruits noted. Cardiovascular:      Rate and Rhythm: Normal rate and regular rhythm. Heart sounds: Normal heart sounds. No murmur heard. No friction rub. No gallop. Pulmonary:      Effort: Pulmonary effort is normal. No respiratory distress. Breath sounds: Normal breath sounds. No wheezing or rales. Chest:      Chest wall: No tenderness. Abdominal:      General: Abdomen is protuberant. A surgical scar is present. Bowel sounds are normal. There is distension. Palpations: Abdomen is rigid. There is no mass. Tenderness: There is generalized abdominal tenderness. There is guarding. There is no rebound. Comments: Is surgical abdomen   Musculoskeletal:         General: No tenderness. Normal range of motion. Cervical back: Normal range of motion and neck supple. Lymphadenopathy:      Cervical: No cervical adenopathy. Skin:     General: Skin is warm and dry. Capillary Refill: Capillary refill takes more than 3 seconds. Coloration: Skin is not cyanotic, mottled or pale. Findings: No erythema or rash. Neurological:      General: No focal deficit present. Mental Status: He is alert and oriented to person, place, and time. Cranial Nerves:  No cranial nerve deficit. Coordination: Coordination normal.      Deep Tendon Reflexes: Reflexes are normal and symmetric. Psychiatric:         Mood and Affect: Mood is anxious. Behavior: Behavior normal.         Thought Content: Thought content normal.         Judgment: Judgment normal.          MDM  Number of Diagnoses or Management Options     Amount and/or Complexity of Data Reviewed  Clinical lab tests: ordered and reviewed  Tests in the radiology section of CPT®: ordered and reviewed  Decide to obtain previous medical records or to obtain history from someone other than the patient: yes  Review and summarize past medical records: yes  Discuss the patient with other providers: yes  Independent visualization of images, tracings, or specimens: yes    Risk of Complications, Morbidity, and/or Mortality  Presenting problems: high  Diagnostic procedures: high  Management options: high    Patient Progress  Patient progress: stable         Procedures    This is a 52-year-old male in acute distress with distended painful abdomen with nausea and vomiting. He is vomiting coffee-ground materials and passing dark blood in his stool. He states that when this happens he usually has some ischemic bowel. He is pacing around the room with pain. 9:41 PM I have spoken with both the radiologist and the surgeon about this patient. The hemoglobin is 7.6 and the patient states that he has been passing dark red blood through his bowel and vomiting coffee-ground material.  He just passed another large dark stool. Patient's pressure is 909 systolic and his pulse rate is 62. [facility] [address] [facility]. Consult has also been placed with the gastroenterologist with a GI bleed. Have also attempted to consult the intensivist.     Patient is being admitted by the intensivist.  He was seen by the general surgeon who did not feel he had a surgical emergency at this time. He was discussed with GI who felt he should be hydrated and given Dilaudid as needed. He will be admitted upstairs. However, the patient gets very aggravated and angry when he does not get his Dilaudid. He is being very manipulative. He has not been very cooperative with staff attempting to provide him care. He has been insulting. He threatens to leave AMA and then when presented with the paperwork he changes his mind. This is very unusual behavior. The patient was admitted to the hospital with no definitive evidence of GI bleeding and he was actively invovled in directing his own care.  He even wanted to place his own NG tube which is highly unusual.     Total critical care time spent exclusive of procedures: 60 minutes

## 2021-07-05 NOTE — PROGRESS NOTES
Patient is walking in the hallways,appears comfortable. I have evaluated the patient along with bed side RN and charge nurse. Patient was agitated and demanding stating that he is in pain and 1 mg dilaudid is not touching. Repeatedly states that he had multiple surgeries in the past involving small intestine and colon,states that he was discharged from recent hospitalization of 4 months in [place] 10 days ago. When I asked him about his discharge medications - he became agitated and said \" you dont need all those\". I tried to explain the plan - IVF,abx, NG tube and repeat scans, gen surgeon recommendations. Again he was focused about pain meds and was argumentative and intermittently apologizing. Complaining about nursing staff as well.      -increased to 2 mg IV dilaudid q 4 hr prn  -IV zosyn  -IVF  -General surgery following. Addendum:  Discussed with nursing staff, if pain management remains an issue.  We can use PCA pump if needed

## 2021-07-06 LAB
ABO + RH BLD: NORMAL
BLD PROD TYP BPU: NORMAL
BLOOD GROUP ANTIBODIES SERPL: NORMAL
BPU ID: NORMAL
CROSSMATCH RESULT,%XM: NORMAL
SPECIMEN EXP DATE BLD: NORMAL
STATUS OF UNIT,%ST: NORMAL
UNIT DIVISION, %UDIV: 0

## 2021-07-06 PROCEDURE — 74011250636 HC RX REV CODE- 250/636: Performed by: HOSPITALIST

## 2021-07-06 PROCEDURE — 74011000258 HC RX REV CODE- 258: Performed by: HOSPITALIST

## 2021-07-06 PROCEDURE — 74011250636 HC RX REV CODE- 250/636: Performed by: NURSE PRACTITIONER

## 2021-07-06 RX ADMIN — HYDROMORPHONE HYDROCHLORIDE 2 MG: 2 INJECTION INTRAMUSCULAR; INTRAVENOUS; SUBCUTANEOUS at 05:19

## 2021-07-06 RX ADMIN — ONDANSETRON 4 MG: 2 INJECTION INTRAMUSCULAR; INTRAVENOUS at 03:36

## 2021-07-06 RX ADMIN — HYDROMORPHONE HYDROCHLORIDE 2 MG: 2 INJECTION INTRAMUSCULAR; INTRAVENOUS; SUBCUTANEOUS at 08:14

## 2021-07-06 RX ADMIN — PIPERACILLIN AND TAZOBACTAM 3.38 G: 3; .375 INJECTION, POWDER, LYOPHILIZED, FOR SOLUTION INTRAVENOUS at 02:06

## 2021-07-06 RX ADMIN — HYDROMORPHONE HYDROCHLORIDE 2 MG: 2 INJECTION INTRAMUSCULAR; INTRAVENOUS; SUBCUTANEOUS at 02:06

## 2021-07-06 NOTE — PROGRESS NOTES
0800 Pt walking in hallway, stating that he is more sick then the rest of the patients here and is not getting care. Pt also noncompliant with NPO order, drinking water out of sink, walking through hallway taking candy from nurses' station. Pt states he is done with antibiotics and to bring the papers because he is getting out of here now. Olman.Lee HICKMAN paged and notified, MD stated to proceed with AMA discharge. 0830 Writer explained risk of leaving against medical advice including risk of death, pt stated that no one here cares about people that serve their country and signed AMA consent, IV removed. 0840 Pt ambulatory, left unit.

## 2021-07-06 NOTE — DISCHARGE SUMMARY
Discharge Summary       PATIENT ID: Palmer Zavala  MRN: [id number]   YOB: 1965    DATE OF ADMISSION: 7/4/2021  7:18 PM    DATE OF DISCHARGE: 7/6/21   PRIMARY CARE PROVIDER: Unknown, Provider, MD     ATTENDING PHYSICIAN: Ildefonso Hameed  DISCHARGING PROVIDER: Tim Atwood MD    To contact this individual call [phone] and ask the  to page. If unavailable ask to be transferred the Adult Hospitalist Department. CONSULTATIONS: IP CONSULT TO GENERAL SURGERY  IP CONSULT TO HOSPITALIST    PROCEDURES/SURGERIES: * No surgery found *    ADMITTING [address] [place] [address] COURSE:   Palmer Zavala is a 55 y.o. 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       In the ED, his vital signs were stable. Significant for WBC of 2.7, and hemoglobin 7.6. CT abdomen, pelvis showed pneumatosis of the ascending colon with minimal adjacent mesenteric stranding possibly suggestive of early ischemic bowel. There is also diffuse colonic distention without definite obstruction.     In the ED, he was treated with Zosyn, Dilaudid, and 1 L normal saline. General surgery was consulted, and recommended conservative management with placement of NG tube to evaluate for GI hemorrhage, and serial imaging with KUB.           DISCHARGE DIAGNOSES / PLAN:      1. AMA     ADDITIONAL CARE RECOMMENDATIONS:       PENDING TEST RESULTS:   At the time of discharge the following test results are still pending:     FOLLOW UP APPOINTMENTS:    Follow-up Information     Follow up With Specialties Details Why Contact Info    Unknown, MD Babita    Patient not available to ask                   NOTIFY YOUR PHYSICIAN FOR ANY OF THE FOLLOWING:   Fever over 101 degrees for 24 hours.    Chest pain, shortness of breath, fever, chills, nausea, vomiting, diarrhea, change in mentation, falling, weakness, bleeding. Severe pain or pain not relieved by medications. Or, any other signs or symptoms that you may have questions about.     DISPOSITION:    Home With:   OT  PT  HH  RN       Long term SNF/Inpatient Rehab    Independent/assisted living    Hospice   x Other: AMA       PATIENT CONDITION AT DISCHARGE:     Functional status    Poor     Deconditioned     Independent      Cognition     Lucid     Forgetful     Dementia      Catheters/lines (plus indication)    Graves     PICC     PEG     None      Code status     Full code     DNR            CHRONIC MEDICAL DIAGNOSES:  Problem List as of 7/6/2021 Never Reviewed        Codes Class Noted - Resolved    GIB (gastrointestinal bleeding) ICD-10-CM: K92.2  ICD-9-CM: 578.9  7/5/2021 - Present              Greater than 20  minutes were spent with the patient on counseling and coordination of care    Signed:   Sole Mclaughlin MD  7/6/2021  8:46 AM

## 2021-07-06 NOTE — PROGRESS NOTES
Problem: Falls - Risk of  Goal: *Absence of Falls  Description: Document Sejal Simon Fall Risk and appropriate interventions in the flowsheet.   Outcome: Progressing Towards Goal  Note: Fall Risk Interventions:            Medication Interventions: Evaluate medications/consider consulting pharmacy                   Problem: Patient Education: Go to Patient Education Activity  Goal: Patient/Family Education  Outcome: Progressing Towards Goal     Problem: General Medical Care Plan  Goal: *Vital signs within specified parameters  Outcome: Progressing Towards Goal  Goal: *Labs within defined limits  Outcome: Progressing Towards Goal  Goal: *Absence of infection signs and symptoms  Outcome: Progressing Towards Goal  Goal: *Optimal pain control at patient's stated goal  Outcome: Progressing Towards Goal  Goal: *Skin integrity maintained  Outcome: Progressing Towards Goal  Goal: *Fluid volume balance  Outcome: Progressing Towards Goal  Goal: *Optimize nutritional status  Outcome: Progressing Towards Goal  Goal: *Anxiety reduced or absent  Outcome: Progressing Towards Goal  Goal: *Progressive mobility and function (eg: ADL's)  Outcome: Progressing Towards Goal     Problem: Patient Education: Go to Patient Education Activity  Goal: Patient/Family Education  Outcome: Progressing Towards Goal

## 2021-07-06 NOTE — PROGRESS NOTES
AMA NOTE     Viv Villar has decided to leave the hospital against medical advice (AMA). He is competent and verbalizes understanding about the risks of leaving AMA, including permanent disability and/or death. He's had opportunities to ask questions about the his condition. The patient has been informed that he may return to this ER for care at any time and urged to to seek medical care if his condition worsens or fails to improve. He has also been instructed to follow up with his PCP. Provided both written and verbal CDC education on steps to help prevent the spread of COVID-19. Patient escorted off premises by staff wearing appropriate PPE and patient wearing  procedural mask.

## 2021-07-06 NOTE — PROGRESS NOTES
Bedside shift change report given to ISAK Arce (oncoming nurse) by Marylen Jews, RN (offgoing nurse). Report included the following information SBAR, Kardex, ED Summary, Procedure Summary, Intake/Output, MAR and Recent Results.

## 2021-07-09 LAB
BACTERIA SPEC CULT: NORMAL
SERVICE CMNT-IMP: NORMAL

## 2022-03-19 PROBLEM — K92.2 GIB (GASTROINTESTINAL BLEEDING): Status: ACTIVE | Noted: 2021-07-05
